# Patient Record
Sex: FEMALE | Race: OTHER | HISPANIC OR LATINO
[De-identification: names, ages, dates, MRNs, and addresses within clinical notes are randomized per-mention and may not be internally consistent; named-entity substitution may affect disease eponyms.]

---

## 2024-10-07 ENCOUNTER — APPOINTMENT (OUTPATIENT)
Dept: OBGYN | Facility: CLINIC | Age: 34
End: 2024-10-07
Payer: COMMERCIAL

## 2024-10-07 VITALS — DIASTOLIC BLOOD PRESSURE: 81 MMHG | SYSTOLIC BLOOD PRESSURE: 123 MMHG | WEIGHT: 161 LBS | BODY MASS INDEX: 28.52 KG/M2

## 2024-10-07 DIAGNOSIS — R53.83 OTHER FATIGUE: ICD-10-CM

## 2024-10-07 DIAGNOSIS — N91.0 PRIMARY AMENORRHEA: ICD-10-CM

## 2024-10-07 DIAGNOSIS — R11.0 NAUSEA: ICD-10-CM

## 2024-10-07 DIAGNOSIS — N64.4 MASTODYNIA: ICD-10-CM

## 2024-10-07 PROCEDURE — 99213 OFFICE O/P EST LOW 20 MIN: CPT | Mod: 25

## 2024-10-07 PROCEDURE — 76830 TRANSVAGINAL US NON-OB: CPT

## 2024-10-19 ENCOUNTER — INPATIENT (INPATIENT)
Facility: HOSPITAL | Age: 34
LOS: 0 days | Discharge: HOME | DRG: 779 | End: 2024-10-20
Attending: OBSTETRICS & GYNECOLOGY | Admitting: OBSTETRICS & GYNECOLOGY
Payer: COMMERCIAL

## 2024-10-19 VITALS
HEIGHT: 63 IN | HEART RATE: 74 BPM | SYSTOLIC BLOOD PRESSURE: 125 MMHG | WEIGHT: 149.91 LBS | DIASTOLIC BLOOD PRESSURE: 82 MMHG | RESPIRATION RATE: 18 BRPM | OXYGEN SATURATION: 98 % | TEMPERATURE: 99 F

## 2024-10-19 DIAGNOSIS — K08.499 PARTIAL LOSS OF TEETH DUE TO OTHER SPECIFIED CAUSE, UNSPECIFIED CLASS: Chronic | ICD-10-CM

## 2024-10-19 LAB
ALBUMIN SERPL ELPH-MCNC: 4.6 G/DL — SIGNIFICANT CHANGE UP (ref 3.3–5)
ALP SERPL-CCNC: 70 U/L — SIGNIFICANT CHANGE UP (ref 40–120)
ALT FLD-CCNC: 17 U/L — SIGNIFICANT CHANGE UP (ref 10–45)
ANION GAP SERPL CALC-SCNC: 10 MMOL/L — SIGNIFICANT CHANGE UP (ref 5–17)
APPEARANCE UR: CLEAR — SIGNIFICANT CHANGE UP
AST SERPL-CCNC: 15 U/L — SIGNIFICANT CHANGE UP (ref 10–40)
BACTERIA # UR AUTO: ABNORMAL /HPF
BASOPHILS # BLD AUTO: 0.04 K/UL — SIGNIFICANT CHANGE UP (ref 0–0.2)
BASOPHILS NFR BLD AUTO: 0.3 % — SIGNIFICANT CHANGE UP (ref 0–2)
BILIRUB SERPL-MCNC: 0.5 MG/DL — SIGNIFICANT CHANGE UP (ref 0.2–1.2)
BILIRUB UR-MCNC: NEGATIVE — SIGNIFICANT CHANGE UP
BLD GP AB SCN SERPL QL: NEGATIVE — SIGNIFICANT CHANGE UP
BUN SERPL-MCNC: 11 MG/DL — SIGNIFICANT CHANGE UP (ref 7–23)
CALCIUM SERPL-MCNC: 9.8 MG/DL — SIGNIFICANT CHANGE UP (ref 8.4–10.5)
CAST: 0 /LPF — SIGNIFICANT CHANGE UP (ref 0–4)
CHLORIDE SERPL-SCNC: 100 MMOL/L — SIGNIFICANT CHANGE UP (ref 96–108)
CO2 SERPL-SCNC: 26 MMOL/L — SIGNIFICANT CHANGE UP (ref 22–31)
COLOR SPEC: YELLOW — SIGNIFICANT CHANGE UP
CREAT SERPL-MCNC: 0.61 MG/DL — SIGNIFICANT CHANGE UP (ref 0.5–1.3)
DIFF PNL FLD: ABNORMAL
EGFR: 122 ML/MIN/1.73M2 — SIGNIFICANT CHANGE UP
EOSINOPHIL # BLD AUTO: 0.34 K/UL — SIGNIFICANT CHANGE UP (ref 0–0.5)
EOSINOPHIL NFR BLD AUTO: 2.8 % — SIGNIFICANT CHANGE UP (ref 0–6)
GLUCOSE SERPL-MCNC: 111 MG/DL — HIGH (ref 70–99)
GLUCOSE UR QL: NEGATIVE MG/DL — SIGNIFICANT CHANGE UP
HCG SERPL-ACNC: 7905 MIU/ML — HIGH
HCT VFR BLD CALC: 38.7 % — SIGNIFICANT CHANGE UP (ref 34.5–45)
HGB BLD-MCNC: 12.9 G/DL — SIGNIFICANT CHANGE UP (ref 11.5–15.5)
IMM GRANULOCYTES NFR BLD AUTO: 0.4 % — SIGNIFICANT CHANGE UP (ref 0–0.9)
KETONES UR-MCNC: NEGATIVE MG/DL — SIGNIFICANT CHANGE UP
LEUKOCYTE ESTERASE UR-ACNC: ABNORMAL
LIDOCAIN IGE QN: 27 U/L — SIGNIFICANT CHANGE UP (ref 7–60)
LYMPHOCYTES # BLD AUTO: 2.78 K/UL — SIGNIFICANT CHANGE UP (ref 1–3.3)
LYMPHOCYTES # BLD AUTO: 22.5 % — SIGNIFICANT CHANGE UP (ref 13–44)
MCHC RBC-ENTMCNC: 29.7 PG — SIGNIFICANT CHANGE UP (ref 27–34)
MCHC RBC-ENTMCNC: 33.3 GM/DL — SIGNIFICANT CHANGE UP (ref 32–36)
MCV RBC AUTO: 89 FL — SIGNIFICANT CHANGE UP (ref 80–100)
MONOCYTES # BLD AUTO: 0.76 K/UL — SIGNIFICANT CHANGE UP (ref 0–0.9)
MONOCYTES NFR BLD AUTO: 6.2 % — SIGNIFICANT CHANGE UP (ref 2–14)
NEUTROPHILS # BLD AUTO: 8.37 K/UL — HIGH (ref 1.8–7.4)
NEUTROPHILS NFR BLD AUTO: 67.8 % — SIGNIFICANT CHANGE UP (ref 43–77)
NITRITE UR-MCNC: NEGATIVE — SIGNIFICANT CHANGE UP
NRBC # BLD: 0 /100 WBCS — SIGNIFICANT CHANGE UP (ref 0–0)
PH UR: 6.5 — SIGNIFICANT CHANGE UP (ref 5–8)
PLATELET # BLD AUTO: 292 K/UL — SIGNIFICANT CHANGE UP (ref 150–400)
POTASSIUM SERPL-MCNC: 3.7 MMOL/L — SIGNIFICANT CHANGE UP (ref 3.5–5.3)
POTASSIUM SERPL-SCNC: 3.7 MMOL/L — SIGNIFICANT CHANGE UP (ref 3.5–5.3)
PROT SERPL-MCNC: 7.5 G/DL — SIGNIFICANT CHANGE UP (ref 6–8.3)
PROT UR-MCNC: NEGATIVE MG/DL — SIGNIFICANT CHANGE UP
RBC # BLD: 4.35 M/UL — SIGNIFICANT CHANGE UP (ref 3.8–5.2)
RBC # FLD: 12.7 % — SIGNIFICANT CHANGE UP (ref 10.3–14.5)
RBC CASTS # UR COMP ASSIST: 6 /HPF — HIGH (ref 0–4)
REVIEW: SIGNIFICANT CHANGE UP
RH IG SCN BLD-IMP: POSITIVE — SIGNIFICANT CHANGE UP
SODIUM SERPL-SCNC: 136 MMOL/L — SIGNIFICANT CHANGE UP (ref 135–145)
SP GR SPEC: 1.02 — SIGNIFICANT CHANGE UP (ref 1–1.03)
SQUAMOUS # UR AUTO: 4 /HPF — SIGNIFICANT CHANGE UP (ref 0–5)
UROBILINOGEN FLD QL: 1 MG/DL — SIGNIFICANT CHANGE UP (ref 0.2–1)
WBC # BLD: 12.34 K/UL — HIGH (ref 3.8–10.5)
WBC # FLD AUTO: 12.34 K/UL — HIGH (ref 3.8–10.5)
WBC UR QL: 6 /HPF — HIGH (ref 0–5)

## 2024-10-19 PROCEDURE — 76817 TRANSVAGINAL US OBSTETRIC: CPT | Mod: 26

## 2024-10-19 PROCEDURE — 99285 EMERGENCY DEPT VISIT HI MDM: CPT

## 2024-10-19 PROCEDURE — 76801 OB US < 14 WKS SINGLE FETUS: CPT | Mod: 26

## 2024-10-19 RX ORDER — SODIUM CHLORIDE 9 MG/ML
1000 INJECTION, SOLUTION INTRAMUSCULAR; INTRAVENOUS; SUBCUTANEOUS ONCE
Refills: 0 | Status: COMPLETED | OUTPATIENT
Start: 2024-10-19 | End: 2024-10-19

## 2024-10-19 RX ADMIN — SODIUM CHLORIDE 1000 MILLILITER(S): 9 INJECTION, SOLUTION INTRAMUSCULAR; INTRAVENOUS; SUBCUTANEOUS at 20:26

## 2024-10-19 NOTE — ED PROVIDER NOTE - NSICDXPASTMEDICALHX_GEN_ALL_CORE_FT
PAST MEDICAL HISTORY:  History of ovarian cyst      (normal spontaneous vaginal delivery) 2019    Spontaneous miscarriage age 19- trisomy 13

## 2024-10-19 NOTE — ED PROVIDER NOTE - PHYSICAL EXAMINATION
VITAL SIGNS: I have reviewed nursing notes and confirm.  CONSTITUTIONAL:  in no acute distress.  SKIN: Skin exam is warm and dry, no acute rash.  HEAD: Normocephalic; atraumatic.  CARD: Regular rate and rhythm.  RESP: No wheezes,  no rales or rhonchi.   ABD:  soft; non-distended; Mildly tender b/l lower quadrants and suprapubic tenderness, No flank pain.

## 2024-10-19 NOTE — ED PROVIDER NOTE - PROGRESS NOTE DETAILS
Shahram Pal, DO PGY-1  HCG   7905  TVUS shows Single intrauterine gestation with crown-rump length measuring 1.7 cm with no detectable heartbeat. Findings are diagnostic of pregnancy failure. Relayed findings to patient, stated understanding. Shahram Pal, DO PGY-1  HCG   7905  TVUS shows Single intrauterine gestation with crown-rump length measuring 1.7 cm with no detectable heartbeat. Findings are diagnostic of pregnancy failure. Relayed findings to patient, stated understanding. Courtesy call to OBGYN as patient has not expelled products & patient of Dr. Landrum. Stated will come see patient. informed pt may need to stay by nursing for D and C, pt w/ bleeding, will have repeat vitals Vijay Collins MD - PGY2: I have been given all relevant clinical information regarding this patient and will be assuming care from the previous provider. Fetal demise at ~approx 10 weeks, OBGYN now considering D&C, discharge dispo reversed, patient made NPO.

## 2024-10-19 NOTE — ED PROVIDER NOTE - NSFOLLOWUPINSTRUCTIONS_ED_ALL_ED_FT
You were seen in the emergency department for *** . We have evaluated you and determined that you do not require further hospital interventions.    During your stay you had the following relevant results:     Please follow up with your primary care provider as necessary to discuss the results of your stay in our department.    If you start to experience worsening symptoms such as *** , please return to the emergency department for further evaluation. You were seen in the emergency department for miscarriage . We have evaluated you and determined that you do not require further hospital interventions.    Please follow up with your primary care provider as necessary to discuss the results of your stay in our department. Please follow up with Lucita Hanna, call his office on Monday.     If you start to experience worsening symptoms such as soaking more than one pad an hour, fever, chills, severe abdominal cramping, please return to the emergency department for further evaluation.    Miscarriage  A miscarriage is the loss of an unborn baby before the 20th week of pregnancy. Most miscarriages occur in the first 3 months of pregnancy. A miscarriage may happen before a woman knows that they're pregnant.    If you lose a pregnancy, talk with your health care provider about:  Any questions you have about the loss of your baby.  How to work through your grief.  Plans for future pregnancy.  What are the causes?  Many times, the cause of this condition is not known.    What increases the risk?  Certain medical conditions    Conditions that affect hormones, such as:  Thyroid problems.  Polycystic ovary syndrome.  Diabetes.  Autoimmune disorders.  Infections.  Bleeding problems.  Obesity.  Lifestyle factors    Smoking, vaping, or use of other products with tobacco or nicotine in them.  Being around people who smoke.  Drinking alcohol or taking certain substances.  Having large amounts of caffeine.  Problems with the body    Scars in the uterus.  Growths, such as fibroids, in the uterus.  Problems in the body that are present at birth.  Infection.  A cervix that opens and thins before your due date.  Personal or medical history    Having had a miscarriage before.  Being younger than age 18 or older than age 35 when you become pregnant.  Being around a harmful things, such as radiation.  Having lead or other heavy metals where you live or work.  Use of some medicines.  What are the signs or symptoms?  Symptoms of this condition include:  Blood or spots of blood coming from the vagina.  Pain or cramps in the belly or low back.  Fluid or tissue coming out of the vagina.  How is this diagnosed?  This condition may be diagnosed based on:  A physical exam.  Ultrasound.  Lab tests, such as blood tests or urine tests.  How is this treated?  Treatment is not needed if all the pregnancy tissue came out of your uterus.    If you need treatment, you may be treated with:  Dilation and curettage (D&C). This removes the remaining tissue from the uterus.  Medicines. These may be given to help your body remove the last of the tissue.  Antibiotics.  Follow these instructions at home:  Medicines    Take your medicines only as told.  If you were given antibiotics, take them as told. Do not stop taking them even if you start to feel better.  Activity    Rest as told by your provider. Ask your provider what activities are safe for you.  If able, have someone help with home and family duties during this time.  General instructions    Two people talking with a counselor.  Watch how much tissue comes out of the vagina.  Watch the size of any blood clots that come out of the vagina.  Do not have sex or douche until your provider says it is okay.  Do not put things, such as tampons, in your vagina until your provider says it is okay.  To help you and your partner with grieving:  Talk with your provider.  See a counselor.  When you are ready, talk with your provider about:  Things to do for your health.  How you can be healthy if you get pregnant again.  Where to find more information  The American College of Obstetricians and Gynecologists: acog.org  [place] Department of Health and Human Services Office of Women's Health: hrsa.gov/office-womens-health  Contact a health care provider if:  You have a fever or chills.  There's bad-smelling fluid coming from your vagina.  You have more bleeding instead of less.  More tissue or blood clots come out of your vagina than you were told to expect.  You become light-headed or weak.  Get help right away if:  Heavy bleeding soaks through 2 large pads an hour for more than 2 hours.  You faint.  You feel sad all the time.  You think about hurting yourself.  These symptoms may be an emergency. Take one of these steps right away:  Go to your nearest emergency room.  Call 911.  Call the Suicide & Crisis Lifeline (free and confidential):  Call 1-129.336.1420 or 277.  Text 905012.  This information is not intended to replace advice given to you by your health care provider. Make sure you discuss any questions you have with your health care provider.

## 2024-10-19 NOTE — ED ADULT NURSE NOTE - OBJECTIVE STATEMENT
35 y/o F A&Ox4 with no significant PMH presents to the ED c/o vaginal bleeding. Pt reports she is 10 weeks pregnant and began having diffuse abdominal cramping this morning. Pt reports later on she began having brown vaginal discharge followed by bright red spotting. Denies chest pain, SOB, n/v/d, lightheadedness, dizziness, fever, chills. IV access established; 20 G R AC. Patient safety maintained, bed is in lowest position, wheels locked, and side rails raised.

## 2024-10-19 NOTE — ED PROVIDER NOTE - OBJECTIVE STATEMENT
Patient is a 34y  female presenting with complaints of vaginal bleeding. Stated 10 weeks pregnant, confirmed intrauterine pregnancy at OBGYN, Dr. Tano Landrum, office. Stated this morning began experiencing diffuse abdominal cramping which was followed by brown blood upon wiping a few hours later. Stated prompted to come to ED once the brown blood turned to bright red spotting. Denies passage of clots, use of pads, fever, chills, chest pain, sob, nausea, vomiting, diarrhea, dysuria, or hematuria. denies pain/discomfort

## 2024-10-19 NOTE — ED PROVIDER NOTE - CLINICAL SUMMARY MEDICAL DECISION MAKING FREE TEXT BOX
Patient is a 34y  female presenting with complaints of vaginal bleeding. Stated 10 weeks pregnant, confirmed intrauterine pregnancy at OBN, Dr. Tano Landrum, office. Stated this morning began experiencing diffuse abdominal cramping which was followed by brown blood upon wiping a few hours later. Stated prompted to come to ED once the brown blood turned to bright red spotting. Denies passage of clots, use of pads, fever, chills, chest pain, sob, nausea, vomiting, diarrhea, dysuria, or hematuria.   PE remarkable for well appearing patient. Abdomen soft non distended. Mildly tender b/l lower quadrants and suprapubic tenderness, No flank pain.   Differentials include not limited to fetal demise, physiological bleeding, uti. Will order cbc, cmp, hcg, type and screen, ua, lipase, tvus to evaluate. Patient is a 34y  female presenting with complaints of vaginal bleeding. Stated 10 weeks pregnant, confirmed intrauterine pregnancy at [facility], Dr. Tano Landrum, office. Stated this morning began experiencing diffuse abdominal cramping which was followed by brown blood upon wiping a few hours later. Stated prompted to come to ED once the brown blood turned to bright red spotting. Denies passage of clots, use of pads, fever, chills, chest pain, sob, nausea, vomiting, diarrhea, dysuria, or hematuria.   PE remarkable for well appearing patient. Abdomen soft non distended. Mildly tender b/l lower quadrants and suprapubic tenderness, No flank pain.   Differentials include not limited to fetal demise, physiological bleeding, uti. Will order cbc, cmp, hcg, type and screen, ua, lipase, tvus to evaluate.  RGUJRAL 33yo female  presents with vaginal spotting. Pt has a confirmed IUP at 10 weeks. Denies any abd pain at this time, cramping earlier today. No n/v. On exam, Patient is awake,alert,oriented x 3. Patient is well appearing and in no acute distress. Patient's chest is clear to ausculation, +s1s2. Abdomen is soft nd/nt +BS. Extremity with no swelling or calf tenderness. Check labs, HCG, Type and screen, US to eval for vaginal bleeding, miscarriage. Monitor and re eval.

## 2024-10-19 NOTE — ED PROVIDER NOTE - PATIENT PORTAL LINK FT
You can access the FollowMyHealth Patient Portal offered by [facility] by registering at the following website: [email or web address]. By joining [facility]’s FollowMyHealth portal, you will also be able to view your health information using other applications (apps) compatible with our system.

## 2024-10-20 VITALS
RESPIRATION RATE: 18 BRPM | TEMPERATURE: 98 F | SYSTOLIC BLOOD PRESSURE: 104 MMHG | DIASTOLIC BLOOD PRESSURE: 68 MMHG | HEART RATE: 76 BPM | OXYGEN SATURATION: 95 %

## 2024-10-20 DIAGNOSIS — O03.9 COMPLETE OR UNSPECIFIED SPONTANEOUS ABORTION WITHOUT COMPLICATION: ICD-10-CM

## 2024-10-20 LAB
APTT BLD: 30.9 SEC — SIGNIFICANT CHANGE UP (ref 24.5–35.6)
BASOPHILS # BLD AUTO: 0.06 K/UL — SIGNIFICANT CHANGE UP (ref 0–0.2)
BASOPHILS NFR BLD AUTO: 0.5 % — SIGNIFICANT CHANGE UP (ref 0–2)
BLD GP AB SCN SERPL QL: NEGATIVE — SIGNIFICANT CHANGE UP
EOSINOPHIL # BLD AUTO: 0.31 K/UL — SIGNIFICANT CHANGE UP (ref 0–0.5)
EOSINOPHIL NFR BLD AUTO: 2.6 % — SIGNIFICANT CHANGE UP (ref 0–6)
FIBRINOGEN PPP-MCNC: 327 MG/DL — SIGNIFICANT CHANGE UP (ref 200–445)
HCT VFR BLD CALC: 36.1 % — SIGNIFICANT CHANGE UP (ref 34.5–45)
HCT VFR BLD CALC: 37.4 % — SIGNIFICANT CHANGE UP (ref 34.5–45)
HGB BLD-MCNC: 11.9 G/DL — SIGNIFICANT CHANGE UP (ref 11.5–15.5)
HGB BLD-MCNC: 12.2 G/DL — SIGNIFICANT CHANGE UP (ref 11.5–15.5)
IMM GRANULOCYTES NFR BLD AUTO: 0.5 % — SIGNIFICANT CHANGE UP (ref 0–0.9)
INR BLD: 1.25 RATIO — HIGH (ref 0.85–1.16)
LYMPHOCYTES # BLD AUTO: 28.5 % — SIGNIFICANT CHANGE UP (ref 13–44)
LYMPHOCYTES # BLD AUTO: 3.41 K/UL — HIGH (ref 1–3.3)
MCHC RBC-ENTMCNC: 28.7 PG — SIGNIFICANT CHANGE UP (ref 27–34)
MCHC RBC-ENTMCNC: 29 PG — SIGNIFICANT CHANGE UP (ref 27–34)
MCHC RBC-ENTMCNC: 32.6 GM/DL — SIGNIFICANT CHANGE UP (ref 32–36)
MCHC RBC-ENTMCNC: 33 GM/DL — SIGNIFICANT CHANGE UP (ref 32–36)
MCV RBC AUTO: 87.2 FL — SIGNIFICANT CHANGE UP (ref 80–100)
MCV RBC AUTO: 88.8 FL — SIGNIFICANT CHANGE UP (ref 80–100)
MONOCYTES # BLD AUTO: 0.8 K/UL — SIGNIFICANT CHANGE UP (ref 0–0.9)
MONOCYTES NFR BLD AUTO: 6.7 % — SIGNIFICANT CHANGE UP (ref 2–14)
NEUTROPHILS # BLD AUTO: 7.33 K/UL — SIGNIFICANT CHANGE UP (ref 1.8–7.4)
NEUTROPHILS NFR BLD AUTO: 61.2 % — SIGNIFICANT CHANGE UP (ref 43–77)
NRBC # BLD: 0 /100 WBCS — SIGNIFICANT CHANGE UP (ref 0–0)
NRBC # BLD: 0 /100 WBCS — SIGNIFICANT CHANGE UP (ref 0–0)
PLATELET # BLD AUTO: 259 K/UL — SIGNIFICANT CHANGE UP (ref 150–400)
PLATELET # BLD AUTO: 270 K/UL — SIGNIFICANT CHANGE UP (ref 150–400)
PROTHROM AB SERPL-ACNC: 14.4 SEC — HIGH (ref 9.9–13.4)
RBC # BLD: 4.14 M/UL — SIGNIFICANT CHANGE UP (ref 3.8–5.2)
RBC # BLD: 4.21 M/UL — SIGNIFICANT CHANGE UP (ref 3.8–5.2)
RBC # FLD: 12.9 % — SIGNIFICANT CHANGE UP (ref 10.3–14.5)
RBC # FLD: 12.9 % — SIGNIFICANT CHANGE UP (ref 10.3–14.5)
RH IG SCN BLD-IMP: POSITIVE — SIGNIFICANT CHANGE UP
WBC # BLD: 11.8 K/UL — HIGH (ref 3.8–10.5)
WBC # BLD: 11.97 K/UL — HIGH (ref 3.8–10.5)
WBC # FLD AUTO: 11.8 K/UL — HIGH (ref 3.8–10.5)
WBC # FLD AUTO: 11.97 K/UL — HIGH (ref 3.8–10.5)

## 2024-10-20 PROCEDURE — 99235 HOSP IP/OBS SAME DATE MOD 70: CPT | Mod: GC

## 2024-10-20 PROCEDURE — 76817 TRANSVAGINAL US OBSTETRIC: CPT

## 2024-10-20 PROCEDURE — 85384 FIBRINOGEN ACTIVITY: CPT

## 2024-10-20 PROCEDURE — 88305 TISSUE EXAM BY PATHOLOGIST: CPT | Mod: 26

## 2024-10-20 PROCEDURE — 85610 PROTHROMBIN TIME: CPT

## 2024-10-20 PROCEDURE — 85025 COMPLETE CBC W/AUTO DIFF WBC: CPT

## 2024-10-20 PROCEDURE — 76830 TRANSVAGINAL US NON-OB: CPT | Mod: 26

## 2024-10-20 PROCEDURE — 99285 EMERGENCY DEPT VISIT HI MDM: CPT | Mod: 25

## 2024-10-20 PROCEDURE — 76801 OB US < 14 WKS SINGLE FETUS: CPT

## 2024-10-20 PROCEDURE — 84702 CHORIONIC GONADOTROPIN TEST: CPT

## 2024-10-20 PROCEDURE — 83690 ASSAY OF LIPASE: CPT

## 2024-10-20 PROCEDURE — 85027 COMPLETE CBC AUTOMATED: CPT

## 2024-10-20 PROCEDURE — 86900 BLOOD TYPING SEROLOGIC ABO: CPT

## 2024-10-20 PROCEDURE — 86901 BLOOD TYPING SEROLOGIC RH(D): CPT

## 2024-10-20 PROCEDURE — 88305 TISSUE EXAM BY PATHOLOGIST: CPT

## 2024-10-20 PROCEDURE — 87086 URINE CULTURE/COLONY COUNT: CPT

## 2024-10-20 PROCEDURE — 81001 URINALYSIS AUTO W/SCOPE: CPT

## 2024-10-20 PROCEDURE — 85730 THROMBOPLASTIN TIME PARTIAL: CPT

## 2024-10-20 PROCEDURE — 86850 RBC ANTIBODY SCREEN: CPT

## 2024-10-20 PROCEDURE — 76830 TRANSVAGINAL US NON-OB: CPT

## 2024-10-20 PROCEDURE — 80053 COMPREHEN METABOLIC PANEL: CPT

## 2024-10-20 RX ORDER — MISOPROSTOL 200 UG/1
4 TABLET ORAL
Qty: 4 | Refills: 0
Start: 2024-10-20

## 2024-10-20 RX ORDER — ACETAMINOPHEN 500 MG
975 TABLET ORAL ONCE
Refills: 0 | Status: COMPLETED | OUTPATIENT
Start: 2024-10-20 | End: 2024-10-20

## 2024-10-20 RX ORDER — IBUPROFEN 200 MG
1 TABLET ORAL
Qty: 0 | Refills: 0 | DISCHARGE

## 2024-10-20 RX ORDER — ACETAMINOPHEN 500 MG
3 TABLET ORAL
Qty: 0 | Refills: 0 | DISCHARGE

## 2024-10-20 RX ORDER — ACETAMINOPHEN 500 MG
1000 TABLET ORAL ONCE
Refills: 0 | Status: COMPLETED | OUTPATIENT
Start: 2024-10-20 | End: 2024-10-20

## 2024-10-20 RX ADMIN — Medication 125 MILLILITER(S): at 02:57

## 2024-10-20 RX ADMIN — Medication 975 MILLIGRAM(S): at 13:54

## 2024-10-20 RX ADMIN — Medication 1000 MILLIGRAM(S): at 07:20

## 2024-10-20 RX ADMIN — Medication 400 MILLIGRAM(S): at 06:41

## 2024-10-20 RX ADMIN — Medication 975 MILLIGRAM(S): at 12:54

## 2024-10-20 NOTE — H&P ADULT - HISTORY OF PRESENT ILLNESS
ELEN MEEK  34y  Female [id number]    HPI:Pa  35yo  at 10w2d (LMP: 24) presents with cramping and vaginal spotting diagnosed with pregnancy failure on TVUS in the ED. Patient reports FHR on prior sonograms in-office, with no FHR on TVUS in ED. Patient reports cramping and spotting starting today prompting her to go to the ED. This was a desired pregnancy. Denies fever, chills, lightheadedness, dizziness.       Name of GYN Physician: Dr. Brian Landrum    POB: D&C for trisomy 13 (),  (),  ()  Pgyn: Denies fibroids, cysts, STI's, Abnormal pap smears   PMH: Denies  PSH: Denies   All: PCN (rash)   Meds: None          Vital Signs Last 24 Hrs  T(C): 37.3 (20 Oct 2024 00:12), Max: 37.3 (20 Oct 2024 00:12)  T(F): 99.2 (20 Oct 2024 00:12), Max: 99.2 (20 Oct 2024 00:12)  HR: 86 (20 Oct 2024 00:12) (67 - 86)  BP: 129/83 (20 Oct 2024 00:12) (114/58 - 129/83)  BP(mean): 74 (19 Oct 2024 20:15) (74 - 74)  RR: 18 (20 Oct 2024 00:12) (18 - 18)  SpO2: 99% (20 Oct 2024 00:12) (98% - 99%)    Parameters below as of 20 Oct 2024 00:12  Patient On (Oxygen Delivery Method): room air

## 2024-10-20 NOTE — DISCHARGE NOTE PROVIDER - HOSPITAL COURSE
33yo  at 10w2d who presented with vaginal bleeding and found to have pregnancy failure on TVUS. patient was admitted for add on D&C procedure. Patient found to have passed products of  conception prior to procedure. Spontaneous  confirmed on TVUS with heterogenous material in the endometrial canal, likely blood. Patient discharged home with misoprostol. Patient to have close follow-up in office with Dr. Landrum 33yo  at 10w3d who presented with vaginal bleeding and found to have pregnancy failure on TVUS. patient was admitted for add on D&C procedure. Patient found to have passed products of  conception prior to procedure. Spontaneous  confirmed on TVUS with heterogenous material in the endometrial canal, likely blood. Patient discharged home with misoprostol. Patient to have close follow-up in office with Dr. Landrum

## 2024-10-20 NOTE — H&P ADULT - ATTENDING COMMENTS
OBGYN Attending Note: Agree with above. Trey seen by me.  33 y/o  at 10wks 2 days by dates of pregnancy presents to the ER with spotting and cramping. Patient evaluated and pelvic ultrasound consistent with a missed , Pelvic ultrasound indicates GS c/w 9weeks 2 days and fetal pole consistent with 8weeks 2 days.   POBHx: 2 ,, history of D/E at 14 wks for trisomy   PE: Pt in NAD VSS  Pelvic exam: cx fingertip dilated, + blood in vagina, no active bleeding, no CMT  Pelvic Us  +intrauteirne GS 9wks 2 days , +fetal pole 8wks 2 days no FH  Labs T/S Rh+  A/P:  3 y/o  at 10wks 2 days by dates of pregnancy with a missed ab, GS 9wks 2 days, fetal pole 8wks 2 days   Trey prefers D/C rather than medicla management at 8-10wks EGA  Admit to GYN service for D/C due to dating, however OR schedule has 7 urgent/emergent cases already scheduled.  IVF  Continue to moinitor  T/S Rh+  coordinate scheduling D/C consider outpatinet scheduling.  Trey Thomson MD OBGYN Attending Note: Agree with above. Trye seen by me.  35 y/o  at 10wks 2 days by dates of pregnancy presents to the ER with spotting and cramping. Patient evaluated and pelvic ultrasound consistent with a missed , Pelvic ultrasound indicates GS c/w 9weeks 2 days and fetal pole consistent with 8weeks 2 days.   POBHx: 2 ,, history of D/E at 14 wks for trisomy   PE: Pt in NAD VSS  Pelvic exam: cx fingertip dilated, + blood in vagina, no active bleeding, no CMT  Pelvic Us  +intrauterine GS 9wks 2 days , +fetal pole 8wks 2 days no FH  Labs T/S Rh+  A/P:  3 y/o  at 10wks 2 days by dates of pregnancy with a missed ab, GS 9wks 2 days, fetal pole 8wks 2 days   Patient prefers D/C rather than medical management at 8-10wks EGA  Admit to GYN service for D/C due to dating, however OR schedule has 7 urgent/emergent cases already scheduled. Patenet on the add on schedule.   IV fluids  Continue to monitor  T/S Rh+  coordinate scheduling for D/C consider outpatient scheduling.  Patient stable  Aziza Thomson MD OBGYN Attending Note: Agree with above. Trey seen by me.  35 y/o  at 10wks 2 days by dates of pregnancy presents to the ER with spotting and cramping. Patient evaluated and pelvic ultrasound consistent with a missed , Pelvic ultrasound indicates GS c/w 9weeks 2 days and fetal pole consistent with 8weeks 2 days.   POBHx: 2 ,, history of D/E at 14 wks for trisomy   PE: Pt in NAD VSS  Pelvic exam: cx fingertip dilated, + blood in vagina, no active bleeding, no CMT  Pelvic Us  +intrauterine GS 9wks 2 days , +fetal pole 8wks 2 days no FH  Labs: Hct 37.4,  T/S Rh+  A/P:  5 y/o  at 10wks 2 days by dates of pregnancy with a missed ab, GS 9wks 2 days, fetal pole 8wks 2 days   Patient prefers D/C rather than medical management at 8-10wks EGA  Admit to GYN service for D/C due to dating, however OR schedule has 7 urgent/emergent cases already scheduled. Patenet on the add on schedule.   IV fluids  Continue to monitor  T/S Rh+  coordinate scheduling for D/C consider outpatient scheduling.  Patient stable  Aziza Thomson MD

## 2024-10-20 NOTE — CHART NOTE - NSCHARTNOTEFT_GEN_A_CORE
S: Patient seen at bedside for increased bleeding and passage of clots. Patient notes passage of clot in toilet. Denies dizziness or lightheadedness. Notes minimal nausea, no vomiting. Notes abdominal cramping.       Obj:     ICU Vital Signs Last 24 Hrs  T(C): 36.9 (20 Oct 2024 05:44), Max: 37.3 (20 Oct 2024 00:12)  T(F): 98.4 (20 Oct 2024 05:44), Max: 99.2 (20 Oct 2024 00:12)  HR: 78 (20 Oct 2024 05:44) (67 - 86)  BP: 111/72 (20 Oct 2024 05:44) (106/72 - 129/83)  BP(mean): 74 (19 Oct 2024 20:15) (74 - 74)  RR: 18 (20 Oct 2024 05:44) (18 - 18)  SpO2: 99% (20 Oct 2024 05:44) (98% - 99%)    O2 Parameters below as of 20 Oct 2024 05:44  Patient On (Oxygen Delivery Method): room air      PE:     Gen: tearful at bedside   Resp: Non-labored breathing on room air   : 15cc clot noted on vulva,  Speculum: upon insertion of speculum noted pooling and passage of clot vs products. After removal, no further pooling, minimal bleeding from the os. External os visible dilated      A/P: 35yo  at 10w2d (LMP: 24) who presented with vaginal spotting and cramping, diagnosed with pregnancy failure on TVUS in the ED. Patient seen at bedside for increase in bleeding. Patient with passage of clots and possible products of conception. Patient with pain relief and decreased vaginal bleeding after passage of clot and products of conception.       - Repeat TVUS to assess endometrial cavity   - CBC at 9am   - Continue NPO status   - Tylenol IV prn   - F/u POC pathology       Seen and d/w Dr. Berto Ceballos, PGY2 S: Patient seen at bedside for increased bleeding and passage of clots. Patient notes passage of clot in toilet. Denies dizziness or lightheadedness. Notes minimal nausea, no vomiting. Notes abdominal cramping.       Obj:     ICU Vital Signs Last 24 Hrs  T(C): 36.9 (20 Oct 2024 05:44), Max: 37.3 (20 Oct 2024 00:12)  T(F): 98.4 (20 Oct 2024 05:44), Max: 99.2 (20 Oct 2024 00:12)  HR: 78 (20 Oct 2024 05:44) (67 - 86)  BP: 111/72 (20 Oct 2024 05:44) (106/72 - 129/83)  BP(mean): 74 (19 Oct 2024 20:15) (74 - 74)  RR: 18 (20 Oct 2024 05:44) (18 - 18)  SpO2: 99% (20 Oct 2024 05:44) (98% - 99%)    O2 Parameters below as of 20 Oct 2024 05:44  Patient On (Oxygen Delivery Method): room air      PE:     Gen: tearful at bedside   Resp: Non-labored breathing on room air   : 15cc clot noted on vulva,  Speculum: upon insertion of speculum noted pooling and passage of clot vs products. After removal, no further pooling, minimal bleeding from the os. External os visible dilated      A/P: 35yo  at 10w2d (LMP: 24) who presented with vaginal spotting and cramping, diagnosed with pregnancy failure on TVUS in the ED. Patient seen at bedside for increase in bleeding. Patient with passage of clots and possible products of conception. Patient with pain relief and decreased vaginal bleeding after passage of clot and products of conception.       - Repeat TVUS to assess endometrial cavity   - CBC at 9am   - Continue NPO status   - Tylenol IV prn   - F/u POC pathology       Seen and d/w Dr. Berto Ceballos, PGY2    OBGYN Attending Note: Agree with above, patinet seen by me.   Patient experiieced increased bleeding and cramping. Evaluated by the resident. +Clots in the vagina and passing clot/tissue from the cervix. bleedingnot brisk on speculum exam cervix. cervic appeared fingertip/slightly dilated  Missed ab likely complete ab at this point.  Patint stable  Pelvic ultrasound to evaluate the lining  Consider cytotec if retained tissue   Social work consult. jemima crying  specimen to be  sent to pathology.  Uma coley, discussed with Dr. Landrum at change of shift the on call physician for today.   Aziza Thomson MD S: Patient seen at bedside for increased bleeding and passage of clots. Patient notes passage of clot in toilet. Denies dizziness or lightheadedness. Notes minimal nausea, no vomiting. Notes abdominal cramping.       Obj:     ICU Vital Signs Last 24 Hrs  T(C): 36.9 (20 Oct 2024 05:44), Max: 37.3 (20 Oct 2024 00:12)  T(F): 98.4 (20 Oct 2024 05:44), Max: 99.2 (20 Oct 2024 00:12)  HR: 78 (20 Oct 2024 05:44) (67 - 86)  BP: 111/72 (20 Oct 2024 05:44) (106/72 - 129/83)  BP(mean): 74 (19 Oct 2024 20:15) (74 - 74)  RR: 18 (20 Oct 2024 05:44) (18 - 18)  SpO2: 99% (20 Oct 2024 05:44) (98% - 99%)    O2 Parameters below as of 20 Oct 2024 05:44  Patient On (Oxygen Delivery Method): room air      PE:     Gen: tearful at bedside   Resp: Non-labored breathing on room air   : 15cc clot noted on vulva,  Speculum: upon insertion of speculum noted pooling and passage of clot vs products. After removal, no further pooling, minimal bleeding from the os. External os visible dilated      A/P: 33yo  at 10w2d (LMP: 24) who presented with vaginal spotting and cramping, diagnosed with pregnancy failure on TVUS in the ED. Patient seen at bedside for increase in bleeding. Patient with passage of clots and possible products of conception. Patient with pain relief and decreased vaginal bleeding after passage of clot and products of conception.       - Repeat TVUS to assess endometrial cavity   - CBC at 9am   - Continue NPO status   - Tylenol IV prn   - F/u POC pathology       Seen and d/w Dr. Berto Ceballos, PGY2        ATTG:  Pt seen and evaluated at bedside at 9:30AM.  She had crescendo decrescendo pain and bleeding and now feels much better.  Likely spontaneous Ab  Will repeat U/S and D/C pt home.  If U/S shows possible retained POC, would D/C home on buccal cytotec  F/U with me in the office in 1 week    OBGYN Attending Note: Agree with above, patialin seen by me.   Patient experiieced increased bleeding and cramping. Evaluated by the resident. +Clots in the vagina and passing clot/tissue from the cervix. bleedingnot brisk on speculum exam cervix. cervic appeared fingertip/slightly dilated  Missed ab likely complete ab at this point.  Patint stable  Pelvic ultrasound to evaluate the lining  Consider cytotec if retained tissue   Social work consult. patinet crying  specimen to be  sent to pathology.  Patint stable, discussed with Dr. Landrum at change of shift the on call physician for today.   Aziza Thomson MD

## 2024-10-20 NOTE — DISCHARGE NOTE PROVIDER - NSDCFUADDINST_GEN_ALL_CORE_FT
Patient to take Misoprostol 800mcg buccally. Place pills between lip and teeth and let dissolve, do not chew or swallow. Call your doctor or present to the emergency room if bleeding is >1 pad/hour.

## 2024-10-20 NOTE — H&P ADULT - ASSESSMENT
A/P: 33yo  at 10w2d (LMP: 24) presents with cramping and vaginal spotting diagnosed with pregnancy failure on TVUS in the ED. Light bleeding on physical exam. VSS. H/H wnl. TVUS showing IUP w/ CRL measuring 1.7cm w/ no detectable heartbeat diagnostic of pregnancy failure. Admit patient to GYN for add-on D&C.   - AM CBC/T&S/Coags  - NPO   - LR@125     D/w Dr. Berto Han, PGY2

## 2024-10-20 NOTE — ED ADULT NURSE REASSESSMENT NOTE - NS ED NURSE REASSESS COMMENT FT1
Patient endorsing hot flashes. MD Ruel Han contacted via teams and made aware. Awaiting new RN orders at this time.

## 2024-10-20 NOTE — DISCHARGE NOTE PROVIDER - NSDCMRMEDTOKEN_GEN_ALL_CORE_FT
acetaminophen 325 mg oral tablet: 3 tab(s) orally every 6 hours  benzocaine 20% topical spray: 1 spray(s) topically every 6 hours, As needed, for Perineal discomfort  dibucaine 1% topical ointment: 1 application topically every 6 hours, As needed, Perineal discomfort  hydrocortisone 1% topical cream: 1 application topically every 6 hours, As needed, Moderate Pain (4-6)  ibuprofen 600 mg oral tablet: 1 tab(s) orally every 6 hours  lanolin topical ointment: 1 application topically every 6 hours, As needed, nipple soreness  pramoxine 1% topical cream: 1 application topically every 4 hours, As needed, Moderate Pain (4-6)  Prenatal Multivitamins with Folic Acid 1 mg oral tablet: 1 tab(s) orally once a day  witch hazel 50% topical pad: 1 application topically every 4 hours, As needed, Perineal discomfort   miSOPROStol 200 mcg oral tablet: 4 tab(s) buccally once Place pills between lips and teeth and let dissolve.  Motrin 600 mg oral tablet: 1 tab(s) orally every 6 hours as needed for  moderate pain  Tylenol 325 mg oral tablet: 3 tab(s) orally every 6 hours as needed for  moderate pain

## 2024-10-20 NOTE — DISCHARGE NOTE PROVIDER - NSDCFUSCHEDAPPT_GEN_ALL_CORE_FT
[facility] Physician [facility]  ANTEPARTUM [address] [place] [address]  Scheduled Appointment: 11/01/2024    Tano Landrum  [facility]  OBGYNGEN [address] [place] [address]  Scheduled Appointment: 11/05/2024

## 2024-10-20 NOTE — DISCHARGE NOTE NURSING/CASE MANAGEMENT/SOCIAL WORK - FINANCIAL ASSISTANCE
[facility] provides services at a reduced cost to those who are determined to be eligible through [facility]’s financial assistance program. Information regarding [facility]’s financial assistance program can be found by going to [email or web address] or by calling [phone].

## 2024-10-20 NOTE — H&P ADULT - NSHPPHYSICALEXAM_GEN_ALL_CORE
Physical Exam:   General: sitting comfortably in bed, NAD   Lungs: Breathing comfortably on room air   Back: No CVA tenderness  Abd: Soft, non-tender, non-distended.  Bowel sounds present.    :  Light bleeding on pad.    External labia wnl.  Bimanual exam with no cervical motion tenderness, uterus wnl, adnexa non palpable b/l.    Speculum Exam: Light bleeding from os.  Physiologic discharge.    Ext: non-tender b/l, no edema

## 2024-10-20 NOTE — DISCHARGE NOTE PROVIDER - CARE PROVIDER_API CALL
Tano Landrum  Obstetrics and Gynecology  [address] [facility] [address]  [place] [address]  Phone: [phone]  Fax: [phone]  Established Patient  Follow Up Time: 2 weeks

## 2024-10-20 NOTE — H&P ADULT - NSHPLABSRESULTS_GEN_ALL_CORE
LABS:                            12.9   12.34 )-----------( 292      ( 19 Oct 2024 20:19 )             38.7     10    136  |  100  |  11  ----------------------------<  111[H]  3.7   |  26  |  0.61    Ca    9.8      19 Oct 2024 20:19    TPro  7.5  /  Alb  4.6  /  TBili  0.5  /  DBili  x   /  AST  15  /  ALT  17  /  AlkPhos  70  10-19    I&O's Detail          Urinalysis Basic - ( 19 Oct 2024 20:44 )    Color: Yellow / Appearance: Clear / S.020 / pH: x  Gluc: x / Ketone: Negative mg/dL  / Bili: Negative / Urobili: 1.0 mg/dL   Blood: x / Protein: Negative mg/dL / Nitrite: Negative   Leuk Esterase: Trace / RBC: 6 /HPF / WBC 6 /HPF   Sq Epi: x / Non Sq Epi: 4 /HPF / Bacteria: Few /HPF        ACC: [id number] EXAM:  US OB TRANSVAGINAL   ORDERED BY:  JENNIFER MAE     ACC: [id number] EXAM:  US OB LES THAN 14 WKS 1ST GEST   ORDERED BY:  JENNIFER MAE     PROCEDURE DATE:  10/19/2024          INTERPRETATION:  CLINICAL INFORMATION: 10 weeks pregnant, presenting with   vaginal bleeding. Beta-hCG of 7905.    LMP: 2024    Estimated Gestational Age by LMP: 10 weeks 2 days    COMPARISON: None available.    Endovaginal pelvic sonogram only.    FINDINGS:  Uterus: Single intrauterine gestational sac, low-lying in position.   Endocervical canal contains fluid and blood products.    Gestational Sac Size (mean): 3.9 cm, corresponding to estimated   gestational age of 9 weeks and 2 days  Gestational Sac Shape : Normal.  Crown Rump Length: 1.7 cm  Estimated Gestational Age: 8 weeks 2 days by crown rump length.  Yolk Sac: Normal  Fetal Heart Rate: Not detected.    Right ovary: 2.4 cm x 2.2 cm x 2.3 cm. Within normal limits.  Left ovary: 3.0 cm x 1.2 cm x 1.7 cm. Within normal limits.    Fluid: No free pelvic fluid.    IMPRESSION:  Single intrauterine gestation with crown-rump length measuring 1.7 cm   with no detectable heartbeat. Findings are diagnostic of pregnancy   failure.    --- End of Report ---          LUIS GRULLON MD; Resident Radiologist  This document has been electronically signed.  DELVIN SRIVASTAVA MD; Attending Radiologist  This document has been electronically signed. Oct 19 2024 10:46PM

## 2024-10-21 LAB
CULTURE RESULTS: SIGNIFICANT CHANGE UP
SPECIMEN SOURCE: SIGNIFICANT CHANGE UP

## 2024-10-23 DIAGNOSIS — O03.9 COMPLETE OR UNSPECIFIED SPONTANEOUS ABORTION W/OUT COMPLICATION: ICD-10-CM

## 2024-10-28 LAB — HCG SERPL-MCNC: 225 MIU/ML

## 2024-10-29 LAB — SURGICAL PATHOLOGY STUDY: SIGNIFICANT CHANGE UP

## 2024-10-31 ENCOUNTER — TRANSCRIPTION ENCOUNTER (OUTPATIENT)
Age: 34
End: 2024-10-31

## 2024-11-01 ENCOUNTER — APPOINTMENT (OUTPATIENT)
Dept: ANTEPARTUM | Facility: CLINIC | Age: 34
End: 2024-11-01

## 2024-11-04 LAB — HCG SERPL-MCNC: 24 MIU/ML

## 2024-11-05 ENCOUNTER — APPOINTMENT (OUTPATIENT)
Dept: OBGYN | Facility: CLINIC | Age: 34
End: 2024-11-05
Payer: COMMERCIAL

## 2024-11-05 VITALS — SYSTOLIC BLOOD PRESSURE: 117 MMHG | DIASTOLIC BLOOD PRESSURE: 78 MMHG

## 2024-11-05 DIAGNOSIS — O03.9 COMPLETE OR UNSPECIFIED SPONTANEOUS ABORTION W/OUT COMPLICATION: ICD-10-CM

## 2024-11-05 PROCEDURE — 99459 PELVIC EXAMINATION: CPT

## 2024-11-05 PROCEDURE — 99213 OFFICE O/P EST LOW 20 MIN: CPT

## 2025-06-05 ENCOUNTER — APPOINTMENT (OUTPATIENT)
Dept: OBGYN | Facility: CLINIC | Age: 35
End: 2025-06-05
Payer: COMMERCIAL

## 2025-06-05 VITALS
WEIGHT: 160.13 LBS | DIASTOLIC BLOOD PRESSURE: 82 MMHG | BODY MASS INDEX: 28.37 KG/M2 | HEIGHT: 63 IN | SYSTOLIC BLOOD PRESSURE: 136 MMHG

## 2025-06-05 DIAGNOSIS — N94.6 DYSMENORRHEA, UNSPECIFIED: ICD-10-CM

## 2025-06-05 DIAGNOSIS — N92.0 EXCESSIVE AND FREQUENT MENSTRUATION WITH REGULAR CYCLE: ICD-10-CM

## 2025-06-05 PROCEDURE — 99214 OFFICE O/P EST MOD 30 MIN: CPT | Mod: 25

## 2025-06-05 PROCEDURE — 81025 URINE PREGNANCY TEST: CPT

## 2025-06-09 ENCOUNTER — TRANSCRIPTION ENCOUNTER (OUTPATIENT)
Age: 35
End: 2025-06-09

## 2025-06-09 LAB
BASOPHILS # BLD AUTO: 0.07 K/UL
BASOPHILS NFR BLD AUTO: 0.7 %
EOSINOPHIL # BLD AUTO: 0.34 K/UL
EOSINOPHIL NFR BLD AUTO: 3.2 %
FSH SERPL-MCNC: 4.8 IU/L
HCG UR QL: NEGATIVE
HCT VFR BLD CALC: 40 %
HGB BLD-MCNC: 13 G/DL
IMM GRANULOCYTES NFR BLD AUTO: 0.5 %
LYMPHOCYTES # BLD AUTO: 2.38 K/UL
LYMPHOCYTES NFR BLD AUTO: 22.2 %
MAN DIFF?: NORMAL
MCHC RBC-ENTMCNC: 29.3 PG
MCHC RBC-ENTMCNC: 32.5 G/DL
MCV RBC AUTO: 90.3 FL
MONOCYTES # BLD AUTO: 0.9 K/UL
MONOCYTES NFR BLD AUTO: 8.4 %
NEUTROPHILS # BLD AUTO: 6.98 K/UL
NEUTROPHILS NFR BLD AUTO: 65 %
PLATELET # BLD AUTO: 360 K/UL
PROLACTIN SERPL-MCNC: 16.2 NG/ML
QUALITY CONTROL: YES
RBC # BLD: 4.43 M/UL
RBC # FLD: 13.3 %
TSH SERPL-ACNC: 1.14 UIU/ML
WBC # FLD AUTO: 10.72 K/UL

## 2025-06-23 ENCOUNTER — APPOINTMENT (OUTPATIENT)
Dept: ULTRASOUND IMAGING | Facility: CLINIC | Age: 35
End: 2025-06-23

## 2025-06-23 ENCOUNTER — OUTPATIENT (OUTPATIENT)
Dept: OUTPATIENT SERVICES | Facility: HOSPITAL | Age: 35
LOS: 1 days | End: 2025-06-23
Payer: COMMERCIAL

## 2025-06-23 DIAGNOSIS — N92.0 EXCESSIVE AND FREQUENT MENSTRUATION WITH REGULAR CYCLE: ICD-10-CM

## 2025-06-23 DIAGNOSIS — K08.499 PARTIAL LOSS OF TEETH DUE TO OTHER SPECIFIED CAUSE, UNSPECIFIED CLASS: Chronic | ICD-10-CM

## 2025-06-23 PROCEDURE — 76856 US EXAM PELVIC COMPLETE: CPT | Mod: 26

## 2025-06-23 PROCEDURE — 76830 TRANSVAGINAL US NON-OB: CPT | Mod: 26

## 2025-06-23 PROCEDURE — 76830 TRANSVAGINAL US NON-OB: CPT

## 2025-06-23 PROCEDURE — 76856 US EXAM PELVIC COMPLETE: CPT

## 2025-06-26 ENCOUNTER — TRANSCRIPTION ENCOUNTER (OUTPATIENT)
Age: 35
End: 2025-06-26

## 2025-06-30 ENCOUNTER — APPOINTMENT (OUTPATIENT)
Dept: OBGYN | Facility: CLINIC | Age: 35
End: 2025-06-30
Payer: COMMERCIAL

## 2025-06-30 PROCEDURE — 99213 OFFICE O/P EST LOW 20 MIN: CPT | Mod: 3W

## 2025-07-01 ENCOUNTER — NON-APPOINTMENT (OUTPATIENT)
Age: 35
End: 2025-07-01